# Patient Record
Sex: FEMALE | Race: WHITE | NOT HISPANIC OR LATINO | URBAN - METROPOLITAN AREA
[De-identification: names, ages, dates, MRNs, and addresses within clinical notes are randomized per-mention and may not be internally consistent; named-entity substitution may affect disease eponyms.]

---

## 2021-01-06 ENCOUNTER — OFFICE VISIT (OUTPATIENT)
Dept: URBAN - METROPOLITAN AREA TELEHEALTH 2 | Facility: TELEHEALTH | Age: 61
End: 2021-01-06

## 2021-08-28 ENCOUNTER — TELEPHONE ENCOUNTER (OUTPATIENT)
Dept: URBAN - METROPOLITAN AREA CLINIC 13 | Facility: CLINIC | Age: 61
End: 2021-08-28

## 2021-08-29 ENCOUNTER — TELEPHONE ENCOUNTER (OUTPATIENT)
Dept: URBAN - METROPOLITAN AREA CLINIC 13 | Facility: CLINIC | Age: 61
End: 2021-08-29

## 2022-04-27 ENCOUNTER — OFFICE VISIT (OUTPATIENT)
Dept: URBAN - NONMETROPOLITAN AREA CLINIC 2 | Facility: CLINIC | Age: 62
End: 2022-04-27
Payer: COMMERCIAL

## 2022-04-27 ENCOUNTER — LAB OUTSIDE AN ENCOUNTER (OUTPATIENT)
Dept: URBAN - NONMETROPOLITAN AREA CLINIC 2 | Facility: CLINIC | Age: 62
End: 2022-04-27

## 2022-04-27 ENCOUNTER — TELEPHONE ENCOUNTER (OUTPATIENT)
Dept: URBAN - NONMETROPOLITAN AREA CLINIC 2 | Facility: CLINIC | Age: 62
End: 2022-04-27

## 2022-04-27 ENCOUNTER — WEB ENCOUNTER (OUTPATIENT)
Dept: URBAN - NONMETROPOLITAN AREA CLINIC 2 | Facility: CLINIC | Age: 62
End: 2022-04-27

## 2022-04-27 DIAGNOSIS — Z80.0 FAMILY HISTORY OF COLON CANCER: ICD-10-CM

## 2022-04-27 DIAGNOSIS — K42.9 PERIUMBILICAL HERNIA: ICD-10-CM

## 2022-04-27 DIAGNOSIS — Z12.11 COLON CANCER SCREENING: ICD-10-CM

## 2022-04-27 DIAGNOSIS — R19.7 DIARRHEA, UNSPECIFIED TYPE: ICD-10-CM

## 2022-04-27 PROCEDURE — 99204 OFFICE O/P NEW MOD 45 MIN: CPT | Performed by: NURSE PRACTITIONER

## 2022-04-27 RX ORDER — SODIUM PICOSULFATE, MAGNESIUM OXIDE, AND ANHYDROUS CITRIC ACID 10; 3.5; 12 MG/160ML; G/160ML; G/160ML
160 ML LIQUID ORAL
Qty: 320 MILLILITER | Refills: 0 | OUTPATIENT
Start: 2022-04-27 | End: 2022-04-28

## 2022-04-27 NOTE — HPI-TODAY'S VISIT:
4/27/2022 Beverly presents for evaluation of a left-sided periumbilical hernia, abdominal cramping and diarrhea.  Since her last visit she has been doing fairly well.  She was last seen in 2017 for bleeding hemorrhoids.  Recently she has had increased loose urgent bowel movements particularly in the morning.  Her last full colonoscopy was in 2012 and normal.  She did have a flex sig in 2017 with grade 1 internal hemorrhoids.  Her reflux is stable on Prevacid over-the-counter.  She saw her primary care physician who gave her dicyclomine for urgency and diarrhea, she does not want to take this if she does not have to.  She does agree to restart low-dose fiber and switch her probiotic to Florastor daily.  Today she agrees to pursue repeat colonoscopy with random biopsies to rule out microscopic colitis and to pursue evaluation with Raiza Slaughter for periumbilical hernia.  MB

## 2022-06-20 ENCOUNTER — TELEPHONE ENCOUNTER (OUTPATIENT)
Dept: URBAN - NONMETROPOLITAN AREA CLINIC 2 | Facility: CLINIC | Age: 62
End: 2022-06-20

## 2022-06-27 ENCOUNTER — OFFICE VISIT (OUTPATIENT)
Dept: URBAN - NONMETROPOLITAN AREA SURGERY CENTER 1 | Facility: SURGERY CENTER | Age: 62
End: 2022-06-27
Payer: COMMERCIAL

## 2022-06-27 DIAGNOSIS — R19.7 ACUTE DIARRHEA: ICD-10-CM

## 2022-06-27 DIAGNOSIS — D12.2 ADENOMA OF ASCENDING COLON: ICD-10-CM

## 2022-06-27 DIAGNOSIS — D12.3 ADENOMA OF TRANSVERSE COLON: ICD-10-CM

## 2022-06-27 DIAGNOSIS — D12.4 ADENOMA OF DESCENDING COLON: ICD-10-CM

## 2022-06-27 PROCEDURE — 45380 COLONOSCOPY AND BIOPSY: CPT | Performed by: INTERNAL MEDICINE

## 2022-06-27 PROCEDURE — 45385 COLONOSCOPY W/LESION REMOVAL: CPT | Performed by: INTERNAL MEDICINE

## 2022-06-27 PROCEDURE — G8907 PT DOC NO EVENTS ON DISCHARG: HCPCS | Performed by: INTERNAL MEDICINE

## 2022-08-25 ENCOUNTER — OFFICE VISIT (OUTPATIENT)
Dept: URBAN - NONMETROPOLITAN AREA CLINIC 2 | Facility: CLINIC | Age: 62
End: 2022-08-25

## 2023-01-23 ENCOUNTER — LAB OUTSIDE AN ENCOUNTER (OUTPATIENT)
Dept: URBAN - NONMETROPOLITAN AREA CLINIC 2 | Facility: CLINIC | Age: 63
End: 2023-01-23

## 2023-01-23 ENCOUNTER — OFFICE VISIT (OUTPATIENT)
Dept: URBAN - NONMETROPOLITAN AREA CLINIC 2 | Facility: CLINIC | Age: 63
End: 2023-01-23
Payer: COMMERCIAL

## 2023-01-23 VITALS
HEIGHT: 63 IN | WEIGHT: 140 LBS | SYSTOLIC BLOOD PRESSURE: 118 MMHG | DIASTOLIC BLOOD PRESSURE: 80 MMHG | HEART RATE: 79 BPM | BODY MASS INDEX: 24.8 KG/M2 | TEMPERATURE: 97.9 F

## 2023-01-23 DIAGNOSIS — Z86.010 PERSONAL HISTORY OF COLONIC POLYPS: ICD-10-CM

## 2023-01-23 DIAGNOSIS — Z12.11 COLON CANCER SCREENING: ICD-10-CM

## 2023-01-23 DIAGNOSIS — Z80.0 FAMILY HISTORY OF COLON CANCER: ICD-10-CM

## 2023-01-23 DIAGNOSIS — R19.7 ACUTE DIARRHEA: ICD-10-CM

## 2023-01-23 DIAGNOSIS — K42.9 PERIUMBILICAL HERNIA: ICD-10-CM

## 2023-01-23 DIAGNOSIS — R10.13 EPIGASTRIC ABDOMINAL PAIN: ICD-10-CM

## 2023-01-23 PROBLEM — 396347007: Status: ACTIVE | Noted: 2022-04-27

## 2023-01-23 PROBLEM — 305058001: Status: ACTIVE | Noted: 2022-04-27

## 2023-01-23 PROCEDURE — 99214 OFFICE O/P EST MOD 30 MIN: CPT | Performed by: INTERNAL MEDICINE

## 2023-01-23 RX ORDER — RIFAXIMIN 550 MG/1
1 TABLET TABLET ORAL THREE TIMES A DAY
Qty: 42 TABLET | Refills: 2 | OUTPATIENT
Start: 2023-01-23 | End: 2023-03-06

## 2023-01-23 RX ORDER — FAMOTIDINE 20 MG/1
TAKE 1 TABLET BY MOUTH TWICE A DAY TABLET ORAL TWICE A DAY
Qty: 180 TABLET | Refills: 3 | OUTPATIENT
Start: 2023-01-23

## 2023-01-23 NOTE — HPI-TODAY'S VISIT:
4/27/2022 Beverly presents for evaluation of a left-sided periumbilical hernia, abdominal cramping and diarrhea.  Since her last visit she has been doing fairly well.  She was last seen in 2017 for bleeding hemorrhoids.  Recently she has had increased loose urgent bowel movements particularly in the morning.  Her last full colonoscopy was in 2012 and normal.  She did have a flex sig in 2017 with grade 1 internal hemorrhoids.  Her reflux is stable on Prevacid over-the-counter.  She saw her primary care physician who gave her dicyclomine for urgency and diarrhea, she does not want to take this if she does not have to.  She does agree to restart low-dose fiber and switch her probiotic to Florastor daily.  Today she agrees to pursue repeat colonoscopy with random biopsies to rule out microscopic colitis and to pursue evaluation with Raiza Slaughter for periumbilical hernia.  MB 1/23/2023 Beverly presents for colonoscopy follow-up.  Since her last visit she saw Dr. Slaughter with no recommendations for surgery for her small medical hernia.  Her colonoscopy reveals a tubular adenomas and left-sided diverticulosis.  She was doing better on fiber and Florastor however her diarrhea has returned.  She is also had epigastric burning with dyspepsia.  She does still have her gallbladder, this is worse after meals.  Her last upper endoscopy was done over 5 years ago by Dr. Rangel with reflux and gastritis.  She is concerned about her persistent symptoms, she is having reflux and regurgitation.  She is not taking anything regularly for this but has tried PPI over-the-counter.  Today we have discussed ultrasound of the gallbladder and scheduling upper endoscopy given her persistent symptoms if she does not respond to 8 weeks of Pepcid twice daily.  We will proceed with a course of Xifaxan for IBS-D given her diarrhea, I do want her to increase her Florastor to 2 pills daily and her fiber to 2 pills at night.  MB

## 2023-01-25 LAB
A/G RATIO: 2.2
ALBUMIN: 4.3
ALKALINE PHOSPHATASE: 87
ALT (SGPT): 19
AST (SGOT): 19
BASO (ABSOLUTE): 0.1
BASOS: 1
BILIRUBIN, TOTAL: 0.2
BUN/CREATININE RATIO: 27
BUN: 20
C-REACTIVE PROTEIN, QUANT: 2
CALCIUM: 9.4
CARBON DIOXIDE, TOTAL: 22
CHLORIDE: 106
CREATININE: 0.74
DEAMIDATED GLIADIN ABS, IGA: 3
DEAMIDATED GLIADIN ABS, IGG: 2
EGFR: 91
ENDOMYSIAL ANTIBODY IGA: NEGATIVE
EOS (ABSOLUTE): 0.1
EOS: 1
GLOBULIN, TOTAL: 2
GLUCOSE: 100
HEMATOCRIT: 45
HEMATOLOGY COMMENTS:: (no result)
HEMOGLOBIN: 14.8
IMMATURE CELLS: (no result)
IMMATURE GRANS (ABS): 0
IMMATURE GRANULOCYTES: 0
IMMUNOGLOBULIN A, QN, SERUM: 199
LYMPHS (ABSOLUTE): 1.5
LYMPHS: 25
MCH: 29.8
MCHC: 32.9
MCV: 91
MONOCYTES(ABSOLUTE): 0.4
MONOCYTES: 6
NEUTROPHILS (ABSOLUTE): 4
NEUTROPHILS: 67
NRBC: (no result)
PLATELETS: 322
POTASSIUM: 3.9
PROTEIN, TOTAL: 6.3
RBC: 4.97
RDW: 12.8
SEDIMENTATION RATE-WESTERGREN: 9
SODIUM: 144
T-TRANSGLUTAMINASE (TTG) IGA: <2
T-TRANSGLUTAMINASE (TTG) IGG: <2
T4,FREE(DIRECT): 1.41
TSH: 1.2
WBC: 6

## 2023-02-10 ENCOUNTER — OFFICE VISIT (OUTPATIENT)
Dept: URBAN - NONMETROPOLITAN AREA CLINIC 1 | Facility: CLINIC | Age: 63
End: 2023-02-10

## 2023-02-10 RX ORDER — RIFAXIMIN 550 MG/1
1 TABLET TABLET ORAL THREE TIMES A DAY
Qty: 42 TABLET | Refills: 2 | Status: ACTIVE | COMMUNITY
Start: 2023-01-23 | End: 2023-03-06

## 2023-02-10 RX ORDER — FAMOTIDINE 20 MG/1
TAKE 1 TABLET BY MOUTH TWICE A DAY TABLET ORAL TWICE A DAY
Qty: 180 TABLET | Refills: 3 | Status: ACTIVE | COMMUNITY
Start: 2023-01-23

## 2023-03-24 ENCOUNTER — OFFICE VISIT (OUTPATIENT)
Dept: URBAN - NONMETROPOLITAN AREA CLINIC 1 | Facility: CLINIC | Age: 63
End: 2023-03-24
Payer: COMMERCIAL

## 2023-03-24 DIAGNOSIS — R93.2 ABNORMAL LIVER ULTRASOUND: ICD-10-CM

## 2023-03-24 PROCEDURE — 76705 ECHO EXAM OF ABDOMEN: CPT | Performed by: INTERNAL MEDICINE

## 2023-03-28 ENCOUNTER — TELEPHONE ENCOUNTER (OUTPATIENT)
Dept: URBAN - METROPOLITAN AREA CLINIC 35 | Facility: CLINIC | Age: 63
End: 2023-03-28

## 2023-03-28 PROBLEM — 197321007: Status: ACTIVE | Noted: 2023-03-28

## 2023-04-17 ENCOUNTER — CLAIMS CREATED FROM THE CLAIM WINDOW (OUTPATIENT)
Dept: URBAN - NONMETROPOLITAN AREA SURGERY CENTER 1 | Facility: SURGERY CENTER | Age: 63
End: 2023-04-17

## 2023-04-17 ENCOUNTER — CLAIMS CREATED FROM THE CLAIM WINDOW (OUTPATIENT)
Dept: URBAN - NONMETROPOLITAN AREA SURGERY CENTER 1 | Facility: SURGERY CENTER | Age: 63
End: 2023-04-17
Payer: COMMERCIAL

## 2023-04-17 DIAGNOSIS — R10.13 ABDOMINAL DISCOMFORT, EPIGASTRIC: ICD-10-CM

## 2023-04-17 DIAGNOSIS — K29.60 ADENOPAPILLOMATOSIS GASTRICA: ICD-10-CM

## 2023-04-17 PROCEDURE — G8907 PT DOC NO EVENTS ON DISCHARG: HCPCS | Performed by: INTERNAL MEDICINE

## 2023-04-17 PROCEDURE — 43239 EGD BIOPSY SINGLE/MULTIPLE: CPT | Performed by: INTERNAL MEDICINE

## 2023-04-19 ENCOUNTER — TELEPHONE ENCOUNTER (OUTPATIENT)
Dept: URBAN - NONMETROPOLITAN AREA CLINIC 2 | Facility: CLINIC | Age: 63
End: 2023-04-19

## 2023-06-27 ENCOUNTER — OFFICE VISIT (OUTPATIENT)
Dept: URBAN - NONMETROPOLITAN AREA CLINIC 2 | Facility: CLINIC | Age: 63
End: 2023-06-27
Payer: COMMERCIAL

## 2023-06-27 VITALS
WEIGHT: 140 LBS | TEMPERATURE: 98.5 F | HEIGHT: 63 IN | BODY MASS INDEX: 24.8 KG/M2 | SYSTOLIC BLOOD PRESSURE: 124 MMHG | DIASTOLIC BLOOD PRESSURE: 84 MMHG | HEART RATE: 86 BPM

## 2023-06-27 DIAGNOSIS — R10.13 EPIGASTRIC ABDOMINAL PAIN: ICD-10-CM

## 2023-06-27 DIAGNOSIS — K76.0 FATTY LIVER: ICD-10-CM

## 2023-06-27 DIAGNOSIS — K42.9 PERIUMBILICAL HERNIA: ICD-10-CM

## 2023-06-27 DIAGNOSIS — R19.7 DIARRHEA, UNSPECIFIED TYPE: ICD-10-CM

## 2023-06-27 PROBLEM — 428283002: Status: ACTIVE | Noted: 2023-01-23

## 2023-06-27 PROBLEM — 79922009: Status: ACTIVE | Noted: 2023-01-23

## 2023-06-27 PROCEDURE — 99214 OFFICE O/P EST MOD 30 MIN: CPT | Performed by: INTERNAL MEDICINE

## 2023-06-27 RX ORDER — FAMOTIDINE 20 MG/1
TAKE 1 TABLET BY MOUTH TWICE A DAY TABLET ORAL TWICE A DAY
Qty: 180 TABLET | Refills: 3 | Status: ACTIVE | COMMUNITY
Start: 2023-01-23

## 2023-06-27 RX ORDER — AMITRIPTYLINE HYDROCHLORIDE 10 MG/1
1 TABLET AT BEDTIME TABLET, FILM COATED ORAL ONCE A DAY
Qty: 90 TABLET | Refills: 3 | OUTPATIENT
Start: 2023-06-27

## 2023-06-27 NOTE — HPI-TODAY'S VISIT:
4/27/2022 Beverly presents for evaluation of a left-sided periumbilical hernia, abdominal cramping and diarrhea.  Since her last visit she has been doing fairly well.  She was last seen in 2017 for bleeding hemorrhoids.  Recently she has had increased loose urgent bowel movements particularly in the morning.  Her last full colonoscopy was in 2012 and normal.  She did have a flex sig in 2017 with grade 1 internal hemorrhoids.  Her reflux is stable on Prevacid over-the-counter.  She saw her primary care physician who gave her dicyclomine for urgency and diarrhea, she does not want to take this if she does not have to.  She does agree to restart low-dose fiber and switch her probiotic to Florastor daily.  Today she agrees to pursue repeat colonoscopy with random biopsies to rule out microscopic colitis and to pursue evaluation with Raiza Slaughter for periumbilical hernia.  MB 1/23/2023 Beverly presents for colonoscopy follow-up.  Since her last visit she saw Dr. Slaughter with no recommendations for surgery for her small medical hernia.  Her colonoscopy reveals a tubular adenomas and left-sided diverticulosis.  She was doing better on fiber and Florastor however her diarrhea has returned.  She is also had epigastric burning with dyspepsia.  She does still have her gallbladder, this is worse after meals.  Her last upper endoscopy was done over 5 years ago by Dr. Rangel with reflux and gastritis.  She is concerned about her persistent symptoms, she is having reflux and regurgitation.  She is not taking anything regularly for this but has tried PPI over-the-counter.  Today we have discussed ultrasound of the gallbladder and scheduling upper endoscopy given her persistent symptoms if she does not respond to 8 weeks of Pepcid twice daily.  We will proceed with a course of Xifaxan for IBS-D given her diarrhea, I do want her to increase her Florastor to 2 pills daily and her fiber to 2 pills at night.  MB 6/27/2023 Beverly presents for endoscopy follow-up.  Her EGD reveals mild gastritis.  She is doing well on lansoprazole 30 mg daily.  She continues to struggle with diarrhea.  Her colonoscopy last year reveals a TA's and normal random biopsies.  She is on fiber and Florastor status post Afaxin with no relief.  Today I am concerned about IBS-D.  I want her to start amitriptyline 10 mg nightly and consider increasing to 25 mg pending her response.  Consider colestipol if no relief.  Continue lansoprazole 30 mg daily.  Her genetic work-up at [facility] [place] was negative.  MB

## 2023-06-29 ENCOUNTER — LAB OUTSIDE AN ENCOUNTER (OUTPATIENT)
Dept: URBAN - NONMETROPOLITAN AREA CLINIC 2 | Facility: CLINIC | Age: 63
End: 2023-06-29

## 2023-06-30 ENCOUNTER — LAB OUTSIDE AN ENCOUNTER (OUTPATIENT)
Dept: URBAN - NONMETROPOLITAN AREA CLINIC 2 | Facility: CLINIC | Age: 63
End: 2023-06-30

## 2023-07-05 ENCOUNTER — TELEPHONE ENCOUNTER (OUTPATIENT)
Dept: URBAN - NONMETROPOLITAN AREA CLINIC 2 | Facility: CLINIC | Age: 63
End: 2023-07-05

## 2023-07-07 ENCOUNTER — WEB ENCOUNTER (OUTPATIENT)
Dept: URBAN - NONMETROPOLITAN AREA CLINIC 2 | Facility: CLINIC | Age: 63
End: 2023-07-07

## 2023-07-11 ENCOUNTER — WEB ENCOUNTER (OUTPATIENT)
Dept: URBAN - NONMETROPOLITAN AREA CLINIC 2 | Facility: CLINIC | Age: 63
End: 2023-07-11

## 2023-08-24 ENCOUNTER — WEB ENCOUNTER (OUTPATIENT)
Dept: URBAN - NONMETROPOLITAN AREA CLINIC 13 | Facility: CLINIC | Age: 63
End: 2023-08-24

## 2023-08-24 RX ORDER — HYDROCORTISONE ACETATE 25 MG/1
1 SUPPOSITORY SUPPOSITORY RECTAL THREE TIMES A DAY
Qty: 42 SUPPOSITORIES | Refills: 2 | OUTPATIENT
Start: 2023-08-24 | End: 2023-10-05

## 2023-11-01 ENCOUNTER — WEB ENCOUNTER (OUTPATIENT)
Dept: URBAN - NONMETROPOLITAN AREA CLINIC 2 | Facility: CLINIC | Age: 63
End: 2023-11-01

## 2023-11-01 ENCOUNTER — DASHBOARD ENCOUNTERS (OUTPATIENT)
Age: 63
End: 2023-11-01

## 2023-11-01 ENCOUNTER — TELEPHONE ENCOUNTER (OUTPATIENT)
Dept: URBAN - NONMETROPOLITAN AREA CLINIC 2 | Facility: CLINIC | Age: 63
End: 2023-11-01

## 2023-11-01 ENCOUNTER — OFFICE VISIT (OUTPATIENT)
Dept: URBAN - METROPOLITAN AREA TELEHEALTH 2 | Facility: TELEHEALTH | Age: 63
End: 2023-11-01
Payer: COMMERCIAL

## 2023-11-01 DIAGNOSIS — K76.0 FATTY LIVER: ICD-10-CM

## 2023-11-01 DIAGNOSIS — K42.9 PERIUMBILICAL HERNIA: ICD-10-CM

## 2023-11-01 DIAGNOSIS — K64.9 ACUTE HEMORRHOID: ICD-10-CM

## 2023-11-01 DIAGNOSIS — R10.13 EPIGASTRIC ABDOMINAL PAIN: ICD-10-CM

## 2023-11-01 PROBLEM — 62315008: Status: ACTIVE | Noted: 2022-04-27

## 2023-11-01 PROBLEM — 70153002: Status: ACTIVE | Noted: 2023-06-27

## 2023-11-01 PROCEDURE — 99214 OFFICE O/P EST MOD 30 MIN: CPT | Performed by: NURSE PRACTITIONER

## 2023-11-01 RX ORDER — FAMOTIDINE 20 MG/1
TAKE 1 TABLET BY MOUTH TWICE A DAY TABLET ORAL TWICE A DAY
Qty: 180 TABLET | Refills: 3 | Status: ACTIVE | COMMUNITY
Start: 2023-01-23

## 2023-11-01 RX ORDER — AMITRIPTYLINE HYDROCHLORIDE 10 MG/1
1 TABLET AT BEDTIME TABLET, FILM COATED ORAL ONCE A DAY
Qty: 90 TABLET | Refills: 3 | Status: ACTIVE | COMMUNITY
Start: 2023-06-27

## 2023-11-01 NOTE — HPI-TODAY'S VISIT:
4/27/2022 Beverly presents for evaluation of a left-sided periumbilical hernia, abdominal cramping and diarrhea.  Since her last visit she has been doing fairly well.  She was last seen in 2017 for bleeding hemorrhoids.  Recently she has had increased loose urgent bowel movements particularly in the morning.  Her last full colonoscopy was in 2012 and normal.  She did have a flex sig in 2017 with grade 1 internal hemorrhoids.  Her reflux is stable on Prevacid over-the-counter.  She saw her primary care physician who gave her dicyclomine for urgency and diarrhea, she does not want to take this if she does not have to.  She does agree to restart low-dose fiber and switch her probiotic to Florastor daily.  Today she agrees to pursue repeat colonoscopy with random biopsies to rule out microscopic colitis and to pursue evaluation with Razia Slaughter for periumbilical hernia.  MB 1/23/2023 Beverly presents for colonoscopy follow-up.  Since her last visit she saw Dr. Slaughter with no recommendations for surgery for her small medical hernia.  Her colonoscopy reveals a tubular adenomas and left-sided diverticulosis.  She was doing better on fiber and Florastor however her diarrhea has returned.  She is also had epigastric burning with dyspepsia.  She does still have her gallbladder, this is worse after meals.  Her last upper endoscopy was done over 5 years ago by Dr. Rangel with reflux and gastritis.  She is concerned about her persistent symptoms, she is having reflux and regurgitation.  She is not taking anything regularly for this but has tried PPI over-the-counter.  Today we have discussed ultrasound of the gallbladder and scheduling upper endoscopy given her persistent symptoms if she does not respond to 8 weeks of Pepcid twice daily.  We will proceed with a course of Xifaxan for IBS-D given her diarrhea, I do want her to increase her Florastor to 2 pills daily and her fiber to 2 pills at night.  MB 6/27/2023 Beverly presents for endoscopy follow-up.  Her EGD reveals mild gastritis.  She is doing well on lansoprazole 30 mg daily.  She continues to struggle with diarrhea.  Her colonoscopy last year reveals a TA's and normal random biopsies.  She is on fiber and Florastor status post Afaxin with no relief.  Today I am concerned about IBS-D.  I want her to start amitriptyline 10 mg nightly and consider increasing to 25 mg pending her response.  Consider colestipol if no relief.  Continue lansoprazole 30 mg daily.  Her genetic work-up at [facility] [place] was negative.  MB 11/1/2023 Beverly presents for follow up of gastritis and IBS-D. She is doing well today. She did not take the AMT for more than 4 weeks as she did not think it helped. She is on florastor and fiber with topamax for migraine prophylaxis and her bowels have improved. She is off PPI and her gastritis symptoms have improved. Her main complaint is her external hemorrhoid, we discussed sitz baths and steroids suppositories. She would like to meet with surgery. Today she is doing well otherwise. MB 138

## 2024-05-02 ENCOUNTER — OFFICE VISIT (OUTPATIENT)
Dept: URBAN - NONMETROPOLITAN AREA CLINIC 2 | Facility: CLINIC | Age: 64
End: 2024-05-02

## 2024-05-24 ENCOUNTER — TELEPHONE ENCOUNTER (OUTPATIENT)
Dept: URBAN - NONMETROPOLITAN AREA CLINIC 2 | Facility: CLINIC | Age: 64
End: 2024-05-24

## 2024-06-07 ENCOUNTER — LAB OUTSIDE AN ENCOUNTER (OUTPATIENT)
Dept: URBAN - NONMETROPOLITAN AREA CLINIC 2 | Facility: CLINIC | Age: 64
End: 2024-06-07

## 2024-06-07 ENCOUNTER — WEB ENCOUNTER (OUTPATIENT)
Dept: URBAN - NONMETROPOLITAN AREA CLINIC 2 | Facility: CLINIC | Age: 64
End: 2024-06-07

## 2024-06-07 ENCOUNTER — OFFICE VISIT (OUTPATIENT)
Dept: URBAN - NONMETROPOLITAN AREA CLINIC 2 | Facility: CLINIC | Age: 64
End: 2024-06-07
Payer: MEDICARE

## 2024-06-07 VITALS
BODY MASS INDEX: 24.27 KG/M2 | HEART RATE: 70 BPM | DIASTOLIC BLOOD PRESSURE: 86 MMHG | WEIGHT: 137 LBS | SYSTOLIC BLOOD PRESSURE: 126 MMHG | HEIGHT: 63 IN

## 2024-06-07 DIAGNOSIS — K42.9 PERIUMBILICAL HERNIA: ICD-10-CM

## 2024-06-07 DIAGNOSIS — K76.0 FATTY LIVER: ICD-10-CM

## 2024-06-07 DIAGNOSIS — R10.13 EPIGASTRIC ABDOMINAL PAIN: ICD-10-CM

## 2024-06-07 DIAGNOSIS — K64.9 HEMORRHOIDS, UNSPECIFIED HEMORRHOID TYPE: ICD-10-CM

## 2024-06-07 PROBLEM — 249517009: Status: ACTIVE | Noted: 2024-06-07

## 2024-06-07 PROCEDURE — 99214 OFFICE O/P EST MOD 30 MIN: CPT | Performed by: NURSE PRACTITIONER

## 2024-06-07 RX ORDER — AMITRIPTYLINE HYDROCHLORIDE 10 MG/1
1 TABLET AT BEDTIME TABLET, FILM COATED ORAL ONCE A DAY
Qty: 90 TABLET | Refills: 3 | Status: ON HOLD | COMMUNITY
Start: 2023-06-27

## 2024-06-07 RX ORDER — FAMOTIDINE 20 MG/1
TAKE 1 TABLET BY MOUTH TWICE A DAY TABLET ORAL TWICE A DAY
Qty: 180 TABLET | Refills: 3 | Status: ON HOLD | COMMUNITY
Start: 2023-01-23

## 2024-06-07 RX ORDER — DICYCLOMINE HYDROCHLORIDE 10 MG/1
1 CAPSULE 30 MINUTES BEFORE EATING CAPSULE ORAL TWICE DAILY
Qty: 60 CAPSULES | Refills: 11
Start: 2024-06-07 | End: 2025-06-05

## 2024-06-07 RX ORDER — FAMOTIDINE 20 MG/1
1 TABLET TABLET, FILM COATED ORAL
Qty: 90 TABLETS | Refills: 3 | OUTPATIENT
Start: 2024-06-07

## 2024-06-07 RX ORDER — DICYCLOMINE HYDROCHLORIDE 10 MG/1
1 CAPSULE 30 MINUTES BEFORE EATING CAPSULE ORAL TWICE DAILY
Qty: 60 CAPSULES | Refills: 11 | OUTPATIENT
Start: 2024-06-07 | End: 2025-06-02

## 2024-06-07 RX ORDER — FAMOTIDINE 20 MG/1
1 TABLET TABLET, FILM COATED ORAL
Qty: 90 TABLETS | Refills: 3
Start: 2024-06-07

## 2024-06-07 NOTE — HPI-TODAY'S VISIT:
4/27/2022 Beverly presents for evaluation of a left-sided periumbilical hernia, abdominal cramping and diarrhea.  Since her last visit she has been doing fairly well.  She was last seen in 2017 for bleeding hemorrhoids.  Recently she has had increased loose urgent bowel movements particularly in the morning.  Her last full colonoscopy was in 2012 and normal.  She did have a flex sig in 2017 with grade 1 internal hemorrhoids.  Her reflux is stable on Prevacid over-the-counter.  She saw her primary care physician who gave her dicyclomine for urgency and diarrhea, she does not want to take this if she does not have to.  She does agree to restart low-dose fiber and switch her probiotic to Florastor daily.  Today she agrees to pursue repeat colonoscopy with random biopsies to rule out microscopic colitis and to pursue evaluation with Raiza Slaughter for periumbilical hernia.  MB 1/23/2023 Beverly presents for colonoscopy follow-up.  Since her last visit she saw Dr. Slaughter with no recommendations for surgery for her small medical hernia.  Her colonoscopy reveals a tubular adenomas and left-sided diverticulosis.  She was doing better on fiber and Florastor however her diarrhea has returned.  She is also had epigastric burning with dyspepsia.  She does still have her gallbladder, this is worse after meals.  Her last upper endoscopy was done over 5 years ago by Dr. Rangel with reflux and gastritis.  She is concerned about her persistent symptoms, she is having reflux and regurgitation.  She is not taking anything regularly for this but has tried PPI over-the-counter.  Today we have discussed ultrasound of the gallbladder and scheduling upper endoscopy given her persistent symptoms if she does not respond to 8 weeks of Pepcid twice daily.  We will proceed with a course of Xifaxan for IBS-D given her diarrhea, I do want her to increase her Florastor to 2 pills daily and her fiber to 2 pills at night.  MB 6/27/2023 Beverly presents for endoscopy follow-up.  Her EGD reveals mild gastritis.  She is doing well on lansoprazole 30 mg daily.  She continues to struggle with diarrhea.  Her colonoscopy last year reveals a TA's and normal random biopsies.  She is on fiber and Florastor status post Afaxin with no relief.  Today I am concerned about IBS-D.  I want her to start amitriptyline 10 mg nightly and consider increasing to 25 mg pending her response.  Consider colestipol if no relief.  Continue lansoprazole 30 mg daily.  Her genetic work-up at [facility] [place] was negative.  MB 11/1/2023 Beverly presents for follow up of gastritis and IBS-D. She is doing well today. She did not take the AMT for more than 4 weeks as she did not think it helped. She is on florastor and fiber with topamax for migraine prophylaxis and her bowels have improved. She is off PPI and her gastritis symptoms have improved. Her main complaint is her external hemorrhoid, we discussed sitz baths and steroids suppositories. She would like to meet with surgery. Today she is doing well otherwise. MB 6/7/2024 The patient presents for follow-up of nausea, constipation with alternating diarrhea, and hemorrhoids.  Since her last visit she did meet with Dr. Rodriguez.  She is scheduled for hemorrhoid banding and skin tag excision.  She is reporting diarrhea with incomplete evacuation.  She is having 3-4 or more bowel movements for the first 2 hours in the morning with soft stool initially then loose stool and incomplete evacuation.  She is taking the 2 capsules of psyllium daily.  She does report some urgency.  She agrees to try dicyclomine 10 mg twice daily, and to add MiraLAX one fourth capful daily if no relief.  Given her persistent symptoms and change in bowel habits, she would like to pursue repeat colonoscopy, we will consider this after her hemorrhoid banding.  She also reports nausea throughout the day.  Her EGD and ultrasound the gallbladder was normal last year.  She is on lansoprazole 30 mg.  She agrees to add Pepcid before supper.  Will schedule gastric emptying study to rule out gastroparesis.  MB

## 2024-06-12 ENCOUNTER — P2P PATIENT RECORD (OUTPATIENT)
Age: 64
End: 2024-06-12

## 2024-06-18 ENCOUNTER — WEB ENCOUNTER (OUTPATIENT)
Dept: URBAN - NONMETROPOLITAN AREA CLINIC 2 | Facility: CLINIC | Age: 64
End: 2024-06-18

## 2024-06-20 ENCOUNTER — WEB ENCOUNTER (OUTPATIENT)
Dept: URBAN - NONMETROPOLITAN AREA CLINIC 2 | Facility: CLINIC | Age: 64
End: 2024-06-20

## 2024-06-21 ENCOUNTER — WEB ENCOUNTER (OUTPATIENT)
Dept: URBAN - NONMETROPOLITAN AREA CLINIC 2 | Facility: CLINIC | Age: 64
End: 2024-06-21

## 2024-07-03 ENCOUNTER — OFFICE VISIT (OUTPATIENT)
Dept: URBAN - METROPOLITAN AREA TELEHEALTH 2 | Facility: TELEHEALTH | Age: 64
End: 2024-07-03
Payer: MEDICARE

## 2024-07-03 DIAGNOSIS — R10.13 EPIGASTRIC ABDOMINAL PAIN: ICD-10-CM

## 2024-07-03 DIAGNOSIS — K76.0 FATTY LIVER: ICD-10-CM

## 2024-07-03 DIAGNOSIS — R19.8 ALTERNATING CONSTIPATION AND DIARRHEA: ICD-10-CM

## 2024-07-03 DIAGNOSIS — K64.8 HEMORRHOID: ICD-10-CM

## 2024-07-03 PROCEDURE — 99214 OFFICE O/P EST MOD 30 MIN: CPT | Performed by: NURSE PRACTITIONER

## 2024-07-03 RX ORDER — AMITRIPTYLINE HYDROCHLORIDE 10 MG/1
1 TABLET AT BEDTIME TABLET, FILM COATED ORAL ONCE A DAY
Qty: 90 TABLET | Refills: 3 | Status: ON HOLD | COMMUNITY
Start: 2023-06-27

## 2024-07-03 RX ORDER — FAMOTIDINE 20 MG/1
TAKE 1 TABLET BY MOUTH TWICE A DAY TABLET ORAL TWICE A DAY
Qty: 180 TABLET | Refills: 3 | Status: ON HOLD | COMMUNITY
Start: 2023-01-23

## 2024-07-03 RX ORDER — FAMOTIDINE 20 MG/1
1 TABLET TABLET, FILM COATED ORAL
Qty: 90 TABLETS | Refills: 3 | Status: ACTIVE | COMMUNITY
Start: 2024-06-07

## 2024-07-03 RX ORDER — DICYCLOMINE HYDROCHLORIDE 10 MG/1
1 CAPSULE 30 MINUTES BEFORE EATING CAPSULE ORAL TWICE DAILY
Qty: 60 CAPSULES | Refills: 11 | Status: ACTIVE | COMMUNITY
Start: 2024-06-07 | End: 2025-06-05

## 2024-07-03 NOTE — HPI-TODAY'S VISIT:
4/27/2022 Beverly presents for evaluation of a left-sided periumbilical hernia, abdominal cramping and diarrhea.  Since her last visit she has been doing fairly well.  She was last seen in 2017 for bleeding hemorrhoids.  Recently she has had increased loose urgent bowel movements particularly in the morning.  Her last full colonoscopy was in 2012 and normal.  She did have a flex sig in 2017 with grade 1 internal hemorrhoids.  Her reflux is stable on Prevacid over-the-counter.  She saw her primary care physician who gave her dicyclomine for urgency and diarrhea, she does not want to take this if she does not have to.  She does agree to restart low-dose fiber and switch her probiotic to Florastor daily.  Today she agrees to pursue repeat colonoscopy with random biopsies to rule out microscopic colitis and to pursue evaluation with Raiza Slaughter for periumbilical hernia.  MB 1/23/2023 Beverly presents for colonoscopy follow-up.  Since her last visit she saw Dr. Slaughter with no recommendations for surgery for her small medical hernia.  Her colonoscopy reveals a tubular adenomas and left-sided diverticulosis.  She was doing better on fiber and Florastor however her diarrhea has returned.  She is also had epigastric burning with dyspepsia.  She does still have her gallbladder, this is worse after meals.  Her last upper endoscopy was done over 5 years ago by Dr. Rangel with reflux and gastritis.  She is concerned about her persistent symptoms, she is having reflux and regurgitation.  She is not taking anything regularly for this but has tried PPI over-the-counter.  Today we have discussed ultrasound of the gallbladder and scheduling upper endoscopy given her persistent symptoms if she does not respond to 8 weeks of Pepcid twice daily.  We will proceed with a course of Xifaxan for IBS-D given her diarrhea, I do want her to increase her Florastor to 2 pills daily and her fiber to 2 pills at night.  MB 6/27/2023 [place] presents for endoscopy follow-up.  Her EGD reveals mild gastritis.  She is doing well on lansoprazole 30 mg daily.  She continues to struggle with diarrhea.  Her colonoscopy last year reveals a TA's and normal random biopsies.  She is on fiber and Florastor status post Afaxin with no relief.  Today I am concerned about IBS-D.  I want her to start amitriptyline 10 mg nightly and consider increasing to 25 mg pending her response.  Consider colestipol if no relief.  Continue lansoprazole 30 mg daily.  Her genetic work-up at [facility] [place] was negative.  MB 11/1/2023 Beverly presents for follow up of gastritis and IBS-D. She is doing well today. She did not take the AMT for more than 4 weeks as she did not think it helped. She is on florastor and fiber with topamax for migraine prophylaxis and her bowels have improved. She is off PPI and her gastritis symptoms have improved. Her main complaint is her external hemorrhoid, we discussed sitz baths and steroids suppositories. She would like to meet with surgery. Today she is doing well otherwise. MB 6/7/2024 The patient presents for follow-up of nausea, constipation with alternating diarrhea, and hemorrhoids.  Since her last visit she did meet with Dr. Rodriguez.  She is scheduled for hemorrhoid banding and skin tag excision.  She is reporting diarrhea with incomplete evacuation.  She is having 3-4 or more bowel movements for the first 2 hours in the morning with soft stool initially then loose stool and incomplete evacuation.  She is taking the 2 capsules of psyllium daily.  She does report some urgency.  She agrees to try dicyclomine 10 mg twice daily, and to add MiraLAX one fourth capful daily if no relief.  Given her persistent symptoms and change in bowel habits, she would like to pursue repeat colonoscopy, we will consider this after her hemorrhoid banding.  She also reports nausea throughout the day.  Her EGD and ultrasound the gallbladder was normal last year.  She is on lansoprazole 30 mg.  She agrees to add Pepcid before supper.  Will schedule gastric emptying study to rule out gastroparesis.  MB 7/3/2025 The patient presents for follow-up.  Her epigastric abdominal pain seems improved on the famotidine, she remains on her lansoprazole in the morning.  Her bowels have improved post hemorrhoid removal.  She is not having as much incomplete evacuation.  She has a soft mood bowel movement in the morning and then 2 looser stools after.  She does think the dicyclomine twice daily is helping.  She is not having to take the MiraLAX.  She is scheduled for her colonoscopy in September which she would like to keep with her history of polyps.  Today she is doing much better in regard to her GI complaints.  MB

## 2024-08-19 ENCOUNTER — WEB ENCOUNTER (OUTPATIENT)
Dept: URBAN - NONMETROPOLITAN AREA CLINIC 2 | Facility: CLINIC | Age: 64
End: 2024-08-19

## 2024-09-02 ENCOUNTER — WEB ENCOUNTER (OUTPATIENT)
Dept: URBAN - NONMETROPOLITAN AREA CLINIC 2 | Facility: CLINIC | Age: 64
End: 2024-09-02

## 2024-09-03 ENCOUNTER — WEB ENCOUNTER (OUTPATIENT)
Dept: URBAN - NONMETROPOLITAN AREA CLINIC 2 | Facility: CLINIC | Age: 64
End: 2024-09-03

## 2024-09-07 ENCOUNTER — WEB ENCOUNTER (OUTPATIENT)
Dept: URBAN - NONMETROPOLITAN AREA CLINIC 2 | Facility: CLINIC | Age: 64
End: 2024-09-07

## 2024-09-09 ENCOUNTER — WEB ENCOUNTER (OUTPATIENT)
Dept: URBAN - NONMETROPOLITAN AREA CLINIC 2 | Facility: CLINIC | Age: 64
End: 2024-09-09

## 2024-09-16 ENCOUNTER — OFFICE VISIT (OUTPATIENT)
Dept: URBAN - NONMETROPOLITAN AREA SURGERY CENTER 1 | Facility: SURGERY CENTER | Age: 64
End: 2024-09-16

## 2024-10-16 ENCOUNTER — OFFICE VISIT (OUTPATIENT)
Dept: URBAN - METROPOLITAN AREA TELEHEALTH 2 | Facility: TELEHEALTH | Age: 64
End: 2024-10-16
Payer: MEDICARE

## 2024-10-16 ENCOUNTER — TELEPHONE ENCOUNTER (OUTPATIENT)
Dept: URBAN - NONMETROPOLITAN AREA CLINIC 2 | Facility: CLINIC | Age: 64
End: 2024-10-16

## 2024-10-16 DIAGNOSIS — K76.0 FATTY LIVER: ICD-10-CM

## 2024-10-16 DIAGNOSIS — Z09 EXAMINATION, FOLLOW UP: ICD-10-CM

## 2024-10-16 DIAGNOSIS — K42.9 PERIUMBILICAL HERNIA: ICD-10-CM

## 2024-10-16 DIAGNOSIS — Z80.0 FAMILY HISTORY OF COLON CANCER: ICD-10-CM

## 2024-10-16 DIAGNOSIS — R19.7 CHRONIC DIARRHEA: ICD-10-CM

## 2024-10-16 DIAGNOSIS — Z86.0101 PERSONAL HISTORY OF ADENOMATOUS AND SERRATED COLON POLYPS: ICD-10-CM

## 2024-10-16 DIAGNOSIS — K64.8 INTERNAL HEMORRHOID: ICD-10-CM

## 2024-10-16 DIAGNOSIS — R10.13 EPIGASTRIC ABDOMINAL PAIN: ICD-10-CM

## 2024-10-16 PROBLEM — 236071009: Status: ACTIVE | Noted: 2024-10-16

## 2024-10-16 PROBLEM — 428283002: Status: ACTIVE | Noted: 2024-10-16

## 2024-10-16 PROCEDURE — 99443 PHONE E/M BY PHYS 21-30 MIN: CPT | Performed by: NURSE PRACTITIONER

## 2024-10-16 RX ORDER — DICYCLOMINE HYDROCHLORIDE 10 MG/1
1 CAPSULE 30 MINUTES BEFORE EATING CAPSULE ORAL TWICE DAILY
Qty: 60 CAPSULES | Refills: 11 | Status: ACTIVE | COMMUNITY
Start: 2024-06-07 | End: 2025-06-05

## 2024-10-16 RX ORDER — AMITRIPTYLINE HYDROCHLORIDE 10 MG/1
1 TABLET AT BEDTIME TABLET, FILM COATED ORAL ONCE A DAY
Qty: 90 TABLET | Refills: 3 | Status: ON HOLD | COMMUNITY
Start: 2023-06-27

## 2024-10-16 RX ORDER — FAMOTIDINE 20 MG/1
1 TABLET TABLET, FILM COATED ORAL
Qty: 90 TABLETS | Refills: 3 | Status: ACTIVE | COMMUNITY
Start: 2024-06-07

## 2024-10-16 RX ORDER — FAMOTIDINE 20 MG/1
TAKE 1 TABLET BY MOUTH TWICE A DAY TABLET ORAL TWICE A DAY
Qty: 180 TABLET | Refills: 3 | Status: ON HOLD | COMMUNITY
Start: 2023-01-23

## 2024-10-16 NOTE — HPI-TODAY'S VISIT:
4/27/2022 Beverly presents for evaluation of a left-sided periumbilical hernia, abdominal cramping and diarrhea.  Since her last visit she has been doing fairly well.  She was last seen in 2017 for bleeding hemorrhoids.  Recently she has had increased loose urgent bowel movements particularly in the morning.  Her last full colonoscopy was in 2012 and normal.  She did have a flex sig in 2017 with grade 1 internal hemorrhoids.  Her reflux is stable on Prevacid over-the-counter.  She saw her primary care physician who gave her dicyclomine for urgency and diarrhea, she does not want to take this if she does not have to.  She does agree to restart low-dose fiber and switch her probiotic to Florastor daily.  Today she agrees to pursue repeat colonoscopy with random biopsies to rule out microscopic colitis and to pursue evaluation with Raiza Slaughter for periumbilical hernia.  MB 1/23/2023 Beverly presents for colonoscopy follow-up.  Since her last visit she saw Dr. Slaughter with no recommendations for surgery for her small medical hernia.  Her colonoscopy reveals a tubular adenomas and left-sided diverticulosis.  She was doing better on fiber and Florastor however her diarrhea has returned.  She is also had epigastric burning with dyspepsia.  She does still have her gallbladder, this is worse after meals.  Her last upper endoscopy was done over 5 years ago by Dr. Rangel with reflux and gastritis.  She is concerned about her persistent symptoms, she is having reflux and regurgitation.  She is not taking anything regularly for this but has tried PPI over-the-counter.  Today we have discussed ultrasound of the gallbladder and scheduling upper endoscopy given her persistent symptoms if she does not respond to 8 weeks of Pepcid twice daily.  We will proceed with a course of Xifaxan for IBS-D given her diarrhea, I do want her to increase her Florastor to 2 pills daily and her fiber to 2 pills at night.  MB 6/27/2023 Beverly presents for endoscopy follow-up.  Her EGD reveals mild gastritis.  She is doing well on lansoprazole 30 mg daily.  She continues to struggle with diarrhea.  Her colonoscopy last year reveals a TA's and normal random biopsies.  She is on fiber and Florastor status post Afaxin with no relief.  Today I am concerned about IBS-D.  I want her to start amitriptyline 10 mg nightly and consider increasing to 25 mg pending her response.  Consider colestipol if no relief.  Continue lansoprazole 30 mg daily.  Her genetic work-up at [facility] was negative.  MB 11/1/2023 Beverly presents for follow up of gastritis and IBS-D. She is doing well today. She did not take the AMT for more than 4 weeks as she did not think it helped. She is on florastor and fiber with topamax for migraine prophylaxis and her bowels have improved. She is off PPI and her gastritis symptoms have improved. Her main complaint is her external hemorrhoid, we discussed sitz baths and steroids suppositories. She would like to meet with surgery. Today she is doing well otherwise. MB 6/7/2024 The patient presents for follow-up of nausea, constipation with alternating diarrhea, and hemorrhoids.  Since her last visit she did meet with Dr. Rodriguez.  She is scheduled for hemorrhoid banding and skin tag excision.  She is reporting diarrhea with incomplete evacuation.  She is having 3-4 or more bowel movements for the first 2 hours in the morning with soft stool initially then loose stool and incomplete evacuation.  She is taking the 2 capsules of psyllium daily.  She does report some urgency.  She agrees to try dicyclomine 10 mg twice daily, and to add MiraLAX one fourth capful daily if no relief.  Given her persistent symptoms and change in bowel habits, she would like to pursue repeat colonoscopy, we will consider this after her hemorrhoid banding.  She also reports nausea throughout the day.  Her EGD and ultrasound the gallbladder was normal last year.  She is on lansoprazole 30 mg.  She agrees to add Pepcid before supper.  Will schedule gastric emptying study to rule out gastroparesis.  MB 7/3/2024 The patient presents for follow-up.  Her epigastric abdominal pain seems improved on the famotidine, she remains on her lansoprazole in the morning.  Her bowels have improved post hemorrhoid removal.  She is not having as much incomplete evacuation.  She has a soft mood bowel movement in the morning and then 2 looser stools after.  She does think the dicyclomine twice daily is helping.  She is not having to take the MiraLAX.  She is scheduled for her colonoscopy in September which she would like to keep with her history of polyps.  Today she is doing much better in regard to her GI complaints.  MB 10/16/2024 Beverly presents for follow-up.  Since her last visit she status post hemorrhoidectomy and is actually doing great.  She is having between 3-5 loose urgent bowel movements in the morning.  She is just doing dicyclomine twice daily.  She does agree to restart low-dose fiber and Florastor and use the dicyclomine as needed.  She is due for screening colonoscopy next June and we will schedule this as a screening unless her symptoms worsen.  Overall she denies any further constipation.  She is very glad she had the hemorrhoid surgery.  She is back on lansoprazole 30 mg daily and her gastritis symptoms are improved.  Today she is doing much better.  MB

## 2024-10-23 ENCOUNTER — WEB ENCOUNTER (OUTPATIENT)
Dept: URBAN - NONMETROPOLITAN AREA CLINIC 2 | Facility: CLINIC | Age: 64
End: 2024-10-23

## 2024-10-25 ENCOUNTER — OFFICE VISIT (OUTPATIENT)
Dept: URBAN - NONMETROPOLITAN AREA CLINIC 2 | Facility: CLINIC | Age: 64
End: 2024-10-25

## 2025-04-04 ENCOUNTER — WEB ENCOUNTER (OUTPATIENT)
Dept: URBAN - NONMETROPOLITAN AREA CLINIC 2 | Facility: CLINIC | Age: 65
End: 2025-04-04

## 2025-04-15 ENCOUNTER — WEB ENCOUNTER (OUTPATIENT)
Dept: URBAN - NONMETROPOLITAN AREA CLINIC 2 | Facility: CLINIC | Age: 65
End: 2025-04-15

## 2025-06-02 ENCOUNTER — LAB OUTSIDE AN ENCOUNTER (OUTPATIENT)
Dept: URBAN - METROPOLITAN AREA TELEHEALTH 2 | Facility: TELEHEALTH | Age: 65
End: 2025-06-02

## 2025-06-15 ENCOUNTER — WEB ENCOUNTER (OUTPATIENT)
Dept: URBAN - NONMETROPOLITAN AREA CLINIC 2 | Facility: CLINIC | Age: 65
End: 2025-06-15

## 2025-07-02 ENCOUNTER — OFFICE VISIT (OUTPATIENT)
Dept: URBAN - METROPOLITAN AREA TELEHEALTH 2 | Facility: TELEHEALTH | Age: 65
End: 2025-07-02
Payer: MEDICARE

## 2025-07-02 ENCOUNTER — LAB OUTSIDE AN ENCOUNTER (OUTPATIENT)
Dept: URBAN - METROPOLITAN AREA TELEHEALTH 2 | Facility: TELEHEALTH | Age: 65
End: 2025-07-02

## 2025-07-02 ENCOUNTER — TELEPHONE ENCOUNTER (OUTPATIENT)
Dept: URBAN - NONMETROPOLITAN AREA CLINIC 2 | Facility: CLINIC | Age: 65
End: 2025-07-02

## 2025-07-02 DIAGNOSIS — Z86.0101 PERSONAL HISTORY OF ADENOMATOUS AND SERRATED COLON POLYPS: ICD-10-CM

## 2025-07-02 DIAGNOSIS — K42.9 PERIUMBILICAL HERNIA: ICD-10-CM

## 2025-07-02 DIAGNOSIS — R11.0 NAUSEA: ICD-10-CM

## 2025-07-02 DIAGNOSIS — R19.7 CHRONIC DIARRHEA: ICD-10-CM

## 2025-07-02 DIAGNOSIS — Z80.0 FAMILY HISTORY OF COLON CANCER: ICD-10-CM

## 2025-07-02 DIAGNOSIS — K76.0 FATTY LIVER: ICD-10-CM

## 2025-07-02 DIAGNOSIS — R10.13 EPIGASTRIC ABDOMINAL PAIN: ICD-10-CM

## 2025-07-02 DIAGNOSIS — K64.9 HEMORRHOIDS, UNSPECIFIED HEMORRHOID TYPE: ICD-10-CM

## 2025-07-02 PROBLEM — 422587007: Status: ACTIVE | Noted: 2025-07-02

## 2025-07-02 PROCEDURE — 99214 OFFICE O/P EST MOD 30 MIN: CPT | Performed by: NURSE PRACTITIONER

## 2025-07-02 RX ORDER — AMITRIPTYLINE HYDROCHLORIDE 10 MG/1
1 TABLET AT BEDTIME TABLET, FILM COATED ORAL ONCE A DAY
Qty: 90 TABLET | Refills: 3 | Status: ON HOLD | COMMUNITY
Start: 2023-06-27

## 2025-07-02 RX ORDER — COLESTIPOL HYDROCHLORIDE 1 G/1
1 TABLET TABLET, FILM COATED ORAL TWICE A DAY
Qty: 60 | Refills: 11 | OUTPATIENT
Start: 2025-07-02

## 2025-07-02 RX ORDER — ONDANSETRON 4 MG/1
1 TABLET ON THE TONGUE AND ALLOW TO DISSOLVE TABLET, ORALLY DISINTEGRATING ORAL ONCE A DAY
Qty: 30 | OUTPATIENT
Start: 2025-07-02

## 2025-07-02 RX ORDER — FAMOTIDINE 20 MG/1
TAKE 1 TABLET BY MOUTH TWICE A DAY TABLET ORAL TWICE A DAY
Qty: 180 TABLET | Refills: 3 | Status: ON HOLD | COMMUNITY
Start: 2023-01-23

## 2025-07-02 RX ORDER — FAMOTIDINE 20 MG/1
1 TABLET TABLET, FILM COATED ORAL
Qty: 90 TABLETS | Refills: 3 | Status: ACTIVE | COMMUNITY
Start: 2024-06-07

## 2025-07-02 NOTE — HPI-TODAY'S VISIT:
4/27/2022 Beverly presents for evaluation of a left-sided periumbilical hernia, abdominal cramping and diarrhea.  Since her last visit she has been doing fairly well.  She was last seen in 2017 for bleeding hemorrhoids.  Recently she has had increased loose urgent bowel movements particularly in the morning.  Her last full colonoscopy was in 2012 and normal.  She did have a flex sig in 2017 with grade 1 internal hemorrhoids.  Her reflux is stable on Prevacid over-the-counter.  She saw her primary care physician who gave her dicyclomine for urgency and diarrhea, she does not want to take this if she does not have to.  She does agree to restart low-dose fiber and switch her probiotic to Florastor daily.  Today she agrees to pursue repeat colonoscopy with random biopsies to rule out microscopic colitis and to pursue evaluation with Raiza Slaughter for periumbilical hernia.  MB 1/23/2023 Beverly presents for colonoscopy follow-up.  Since her last visit she saw Dr. Slaughter with no recommendations for surgery for her small medical hernia.  Her colonoscopy reveals a tubular adenomas and left-sided diverticulosis.  She was doing better on fiber and Florastor however her diarrhea has returned.  She is also had epigastric burning with dyspepsia.  She does still have her gallbladder, this is worse after meals.  Her last upper endoscopy was done over 5 years ago by Dr. Rangel with reflux and gastritis.  She is concerned about her persistent symptoms, she is having reflux and regurgitation.  She is not taking anything regularly for this but has tried PPI over-the-counter.  Today we have discussed ultrasound of the gallbladder and scheduling upper endoscopy given her persistent symptoms if she does not respond to 8 weeks of Pepcid twice daily.  We will proceed with a course of Xifaxan for IBS-D given her diarrhea, I do want her to increase her Florastor to 2 pills daily and her fiber to 2 pills at night.  MB 6/27/2023 Beverly presents for endoscopy follow-up.  Her EGD reveals mild gastritis.  She is doing well on lansoprazole 30 mg daily.  She continues to struggle with diarrhea.  Her colonoscopy last year reveals a TA's and normal random biopsies.  She is on fiber and Florastor status post Afaxin with no relief.  Today I am concerned about IBS-D.  I want her to start amitriptyline 10 mg nightly and consider increasing to 25 mg pending her response.  Consider colestipol if no relief.  Continue lansoprazole 30 mg daily.  [place] [facility] [place]  MB 11/1/2023 Beverly presents for follow up of gastritis and IBS-D. She is doing well today. She did not take the AMT for more than 4 weeks as she did not think it helped. She is on florastor and fiber with topamax for migraine prophylaxis and her bowels have improved. She is off PPI and her gastritis symptoms have improved. Her main complaint is her external hemorrhoid, we discussed sitz baths and steroids suppositories. She would like to meet with surgery. Today she is doing well otherwise. MB 6/7/2024 The patient presents for follow-up of nausea, constipation with alternating diarrhea, and hemorrhoids.  Since her last visit she did meet with Dr. Rodriguez.  She is scheduled for hemorrhoid banding and skin tag excision.  She is reporting diarrhea with incomplete evacuation.  She is having 3-4 or more bowel movements for the first 2 hours in the morning with soft stool initially then loose stool and incomplete evacuation.  She is taking the 2 capsules of psyllium daily.  She does report some urgency.  She agrees to try dicyclomine 10 mg twice daily, and to add MiraLAX one fourth capful daily if no relief.  Given her persistent symptoms and change in bowel habits, she would like to pursue repeat colonoscopy, we will consider this after her hemorrhoid banding.  She also reports nausea throughout the day.  Her EGD and ultrasound the gallbladder was normal last year.  She is on lansoprazole 30 mg.  She agrees to add Pepcid before supper.  Will schedule gastric emptying study to rule out gastroparesis.  MB 7/3/2024 The patient presents for follow-up.  Her epigastric abdominal pain seems improved on the famotidine, she remains on her lansoprazole in the morning.  Her bowels have improved post hemorrhoid removal.  She is not having as much incomplete evacuation.  She has a soft mood bowel movement in the morning and then 2 looser stools after.  She does think the dicyclomine twice daily is helping.  She is not having to take the MiraLAX.  She is scheduled for her colonoscopy in September which she would like to keep with her history of polyps.  Today she is doing much better in regard to her GI complaints.  MB 10/16/2024 Beverly presents for follow-up.  Since her last visit she status post hemorrhoidectomy and is actually doing great.  She is having between 3-5 loose urgent bowel movements in the morning.  She is just doing dicyclomine twice daily.  She does agree to restart low-dose fiber and Florastor and use the dicyclomine as needed.  She is due for screening colonoscopy next June and we will schedule this as a screening unless her symptoms worsen.  Overall she denies any further constipation.  She is very glad she had the hemorrhoid surgery.  She is back on lansoprazole 30 mg daily and her gastritis symptoms are improved.  Today she is doing much better.  MB  7/2/2025 Beverly Remy, a 65-year-old female, came in for a chronic condition follow-up centered on her gastrointestinal health and preventive care. She described persistent diarrhea with multiple bowel movements on bad days, expressing concern about possible microscopic colitis. She denied pain with her diarrhea and noted her history of colon polyps, recalling that her last colonoscopy was three years ago with eight polyps removed. She is scheduled for another colonoscopy in October, understanding it is due this year regardless of symptoms. Beverly also reported nausea after breakfast, considering causes such as gastritis, stress, gallbladder issues, or H. pylori infection. For diarrhea management, she discussed switching from cholestyramine powder to a pill form for convenience. She confirmed daily use of lansoprazole (Prevacid) to manage her ongoing gastritis and reflux symptoms. Overall, Beverly remains engaged in her care and attentive to her chronic gastrointestinal and metabolic conditions. MB

## 2025-07-02 NOTE — PHYSICAL EXAM NECK/THYROID:
normal appearance , no deformities , trachea midline [Normal] : Normal [Vitamin] : Primary Fluoride Source: Vitamin [No] : Not at  exposure [de-identified] : Regular for age  [de-identified] : No bottle in bed  [de-identified] : No risks identified

## 2025-07-16 ENCOUNTER — TELEPHONE ENCOUNTER (OUTPATIENT)
Dept: URBAN - NONMETROPOLITAN AREA CLINIC 2 | Facility: CLINIC | Age: 65
End: 2025-07-16

## 2025-07-23 ENCOUNTER — CLAIMS CREATED FROM THE CLAIM WINDOW (OUTPATIENT)
Dept: URBAN - NONMETROPOLITAN AREA SURGERY CENTER 1 | Facility: SURGERY CENTER | Age: 65
End: 2025-07-23

## 2025-07-23 ENCOUNTER — CLAIMS CREATED FROM THE CLAIM WINDOW (OUTPATIENT)
Dept: URBAN - NONMETROPOLITAN AREA SURGERY CENTER 1 | Facility: SURGERY CENTER | Age: 65
End: 2025-07-23
Payer: MEDICARE

## 2025-07-23 ENCOUNTER — CLAIMS CREATED FROM THE CLAIM WINDOW (OUTPATIENT)
Dept: URBAN - METROPOLITAN AREA CLINIC 4 | Facility: CLINIC | Age: 65
End: 2025-07-23
Payer: MEDICARE

## 2025-07-23 DIAGNOSIS — Z86.0100 HISTORY OF COLON POLYPS: ICD-10-CM

## 2025-07-23 DIAGNOSIS — D12.4 ADENOMA OF DESCENDING COLON: ICD-10-CM

## 2025-07-23 DIAGNOSIS — D12.2 ADENOMA OF ASCENDING COLON: ICD-10-CM

## 2025-07-23 DIAGNOSIS — D12.4 BENIGN NEOPLASM OF DESCENDING COLON: ICD-10-CM

## 2025-07-23 DIAGNOSIS — D12.2 BENIGN NEOPLASM OF ASCENDING COLON: ICD-10-CM

## 2025-07-23 PROCEDURE — 0529F INTRVL 3/>YR PTS CLNSCP DOCD: CPT | Performed by: INTERNAL MEDICINE

## 2025-07-23 PROCEDURE — 0529F INTRVL 3/>YR PTS CLNSCP DOCD: CPT | Performed by: CLINIC/CENTER

## 2025-07-23 PROCEDURE — 88305 TISSUE EXAM BY PATHOLOGIST: CPT | Performed by: PATHOLOGY

## 2025-07-23 PROCEDURE — 45385 COLONOSCOPY W/LESION REMOVAL: CPT | Performed by: CLINIC/CENTER

## 2025-07-23 PROCEDURE — 45385 COLONOSCOPY W/LESION REMOVAL: CPT | Performed by: INTERNAL MEDICINE

## 2025-07-23 RX ORDER — FAMOTIDINE 20 MG/1
1 TABLET TABLET, FILM COATED ORAL
Qty: 90 TABLETS | Refills: 3 | Status: ACTIVE | COMMUNITY
Start: 2024-06-07

## 2025-07-23 RX ORDER — FAMOTIDINE 20 MG/1
TAKE 1 TABLET BY MOUTH TWICE A DAY TABLET ORAL TWICE A DAY
Qty: 180 TABLET | Refills: 3 | Status: ON HOLD | COMMUNITY
Start: 2023-01-23

## 2025-07-23 RX ORDER — COLESTIPOL HYDROCHLORIDE 1 G/1
1 TABLET TABLET, FILM COATED ORAL TWICE A DAY
Qty: 60 | Refills: 11 | Status: ACTIVE | COMMUNITY
Start: 2025-07-02

## 2025-07-23 RX ORDER — ONDANSETRON 4 MG/1
1 TABLET ON THE TONGUE AND ALLOW TO DISSOLVE TABLET, ORALLY DISINTEGRATING ORAL ONCE A DAY
Qty: 30 | Status: ACTIVE | COMMUNITY
Start: 2025-07-02

## 2025-07-23 RX ORDER — AMITRIPTYLINE HYDROCHLORIDE 10 MG/1
1 TABLET AT BEDTIME TABLET, FILM COATED ORAL ONCE A DAY
Qty: 90 TABLET | Refills: 3 | Status: ON HOLD | COMMUNITY
Start: 2023-06-27

## 2025-08-19 ENCOUNTER — OFFICE VISIT (OUTPATIENT)
Dept: URBAN - NONMETROPOLITAN AREA CLINIC 2 | Facility: CLINIC | Age: 65
End: 2025-08-19
Payer: MEDICARE

## 2025-08-19 DIAGNOSIS — K64.9 HEMORRHOIDS, UNSPECIFIED HEMORRHOID TYPE: ICD-10-CM

## 2025-08-19 DIAGNOSIS — Z86.0101 PERSONAL HISTORY OF ADENOMATOUS AND SERRATED COLON POLYPS: ICD-10-CM

## 2025-08-19 DIAGNOSIS — K62.89 PROCTALGIA: ICD-10-CM

## 2025-08-19 DIAGNOSIS — Z80.0 FAMILY HISTORY OF COLON CANCER: ICD-10-CM

## 2025-08-19 DIAGNOSIS — K60.2 ANAL FISSURE: ICD-10-CM

## 2025-08-19 DIAGNOSIS — R11.0 NAUSEA: ICD-10-CM

## 2025-08-19 DIAGNOSIS — K52.9 CHRONIC DIARRHEA: ICD-10-CM

## 2025-08-19 DIAGNOSIS — R10.13 EPIGASTRIC ABDOMINAL PAIN: ICD-10-CM

## 2025-08-19 DIAGNOSIS — K76.0 FATTY LIVER: ICD-10-CM

## 2025-08-19 DIAGNOSIS — K42.9 PERIUMBILICAL HERNIA: ICD-10-CM

## 2025-08-19 PROBLEM — 77880009: Status: ACTIVE | Noted: 2025-08-19

## 2025-08-19 PROBLEM — 30037006: Status: ACTIVE | Noted: 2025-08-19

## 2025-08-19 PROCEDURE — 99214 OFFICE O/P EST MOD 30 MIN: CPT | Performed by: NURSE PRACTITIONER

## 2025-08-19 RX ORDER — HYOSCYAMINE SULFATE 100 MG/1
1 TABLET UNDER THE TONGUE AND ALLOW TO DISSOLVE AS NEEDED TABLET ORAL
Qty: 180 TABLET | Refills: 3 | OUTPATIENT
Start: 2025-08-19

## 2025-08-19 RX ORDER — BUPROPION HYDROCHLORIDE 75 MG/1
2 TABLETS TABLET, FILM COATED ORAL TWICE A DAY
Status: ACTIVE | COMMUNITY

## 2025-08-19 RX ORDER — FAMOTIDINE 20 MG/1
TAKE 1 TABLET BY MOUTH TWICE A DAY TABLET ORAL TWICE A DAY
Qty: 180 TABLET | Refills: 3 | Status: ON HOLD | COMMUNITY
Start: 2023-01-23

## 2025-08-19 RX ORDER — FAMOTIDINE 20 MG/1
1 TABLET TABLET, FILM COATED ORAL
Qty: 90 TABLETS | Refills: 3 | Status: ON HOLD | COMMUNITY
Start: 2024-06-07

## 2025-08-19 RX ORDER — AMITRIPTYLINE HYDROCHLORIDE 10 MG/1
1 TABLET AT BEDTIME TABLET, FILM COATED ORAL ONCE A DAY
Qty: 90 TABLET | Refills: 3 | Status: ON HOLD | COMMUNITY
Start: 2023-06-27

## 2025-08-19 RX ORDER — ONDANSETRON 4 MG/1
1 TABLET ON THE TONGUE AND ALLOW TO DISSOLVE TABLET, ORALLY DISINTEGRATING ORAL ONCE A DAY
Qty: 30 | Status: ON HOLD | COMMUNITY
Start: 2025-07-02

## 2025-08-19 RX ORDER — COLESTIPOL HYDROCHLORIDE 1 G/1
1 TABLET TABLET, FILM COATED ORAL TWICE A DAY
Qty: 60 | Refills: 11 | Status: ON HOLD | COMMUNITY
Start: 2025-07-02